# Patient Record
Sex: FEMALE | Race: WHITE | Employment: FULL TIME | URBAN - METROPOLITAN AREA
[De-identification: names, ages, dates, MRNs, and addresses within clinical notes are randomized per-mention and may not be internally consistent; named-entity substitution may affect disease eponyms.]

---

## 2019-08-01 ENCOUNTER — HISTORICAL (OUTPATIENT)
Dept: ADMINISTRATIVE | Facility: HOSPITAL | Age: 54
End: 2019-08-01

## 2021-03-17 LAB — GLUCOSE SERPL-MCNC: 112 MG/DL (ref 70–110)

## 2022-05-03 NOTE — HISTORICAL [facility] CERNER
This is a historical note converted from Nika. Formatting and pictures may have been removed.  Please reference Nika for original formatting and attached multimedia. Chief Complaint  C/O LEFT SHOULDER AND ARM PAIN THAT STARTED A FEW WEEKS AGO.  History of Present Illness  Left shoulder discomfort x 2-3 weeks.? No change in activity.? Pain occasionally radiates down left arm. Also with discomfort in upper thoracic back that radiates to chest.? No SOB, nonexertional.? No hx heart disease.? No recent trauma or change in activity  Review of Systems  GENERAL:?no? fatigue,  RESP:?noSOB,? ?no?cough,?  CARDIAC:? ?no? chest pain,? ?+?occasional nocturnal?palpitations  GI:? ?no? N&V,? ?no? abd pain  Left shoulder:?? :??no? history of inflammatory joint disease,?no? history of trauma,?nochange in activity involving shoulder_,?no? history of prior surgery to shoulder,  pain in left upper thoracic back with radiation to chest.  Physical Exam  Vitals & Measurements  HR:?78(Peripheral)? BP:?114/72?  HT:?170.1?cm? WT:?74.9?kg? BMI:?25.89?  GENERAL:? NAD  HEENT:? PERRLA, EOMI, from neck without pain  CHEST:? CTA bilaterally  CARDIAC:? RRR, no murmurs audible  EXT:? no edema,  Left ?shoulder:????NoPain with abduction,????No? Pain with forward flexion,???No? Pain with internal rotation,????No?? Pain with external rotation,???No???? AC jt tenderness to palpation,????? ?Nocross body,? ?No??? pain with neck movement  mild tenderness with palpation near L romboid  Assessment/Plan  1.?Left shoulder pain?M25.512  ?xay c-spine, ?mild oa change--, EKG--ok???? refer to cardiology Dr Fraser  ?  Trial of Mobic 15 mg q d  Ordered:  External Referral, atypical chest pain/shoulder pain/ + fm hx heart disease, card, Dr Fraser, 08/01/19 16:30:00 CDT, Left shoulder pain  Chest pain of uncertain etiology  Office/Outpatient Visit Level 3 Established [id number] PC, Left shoulder pain, [facility], 08/01/19 16:30:00 CDT  ?  Orders:  meloxicam, 15 mg = 1  tab(s), Oral, Daily, # 15 tab(s), 0 Refill(s), Pharmacy: [id number]  Clinic Follow-up PRN, 08/01/19 16:30:00 CDT, [facility], Future Order  Referrals  External Referral, atypical chest pain/shoulder pain/ + fm hx heart disease, card, Dr Fraser, 08/01/19 16:30:00 CDT, Left shoulder pain  Chest pain of uncertain etiology  Clinic Follow-up PRN, 08/01/19 16:30:00 CDT, [facility], Future Order   Problem List/Past Medical History  Ongoing  Left shoulder pain  Historical  No qualifying data  Medications  Mobic 15 mg oral tablet, 15 mg= 1 tab(s), Oral, Daily  Allergies  codeine-guaifenesin?(Upset stomach)  Social History  Abuse/Neglect  No, 08/01/2019  Tobacco  Never (less than 100 in lifetime), N/A, 08/01/2019  Health Maintenance  Health Maintenance  ???Pending?(in the next year)  ??? ??OverDue  ??? ? ? ?Alcohol Misuse Screening due??01/01/19??and every 1??year(s)  ??? ??Due?  ??? ? ? ?ADL Screening due??08/01/19??and every 1??year(s)  ??? ? ? ?Tetanus Vaccine due??08/01/19??and every 10??year(s)  ??? ??Due In Future?  ??? ? ? ?Obesity Screening not due until??01/01/20??and every 1??year(s)  ???Satisfied?(in the past 1 year)  ??? ??Satisfied?  ??? ? ? ?Blood Pressure Screening on??08/01/19.??Satisfied by Emili Hylton CMA  ??? ? ? ?Body Mass Index Check on??08/01/19.??Satisfied by Emili Hylton CMA  ??? ? ? ?Obesity Screening on??08/01/19.??Satisfied by Emili Hylton CMA [facility]  ?

## 2022-09-18 ENCOUNTER — HISTORICAL (OUTPATIENT)
Dept: ADMINISTRATIVE | Facility: HOSPITAL | Age: 57
End: 2022-09-18